# Patient Record
Sex: MALE
[De-identification: names, ages, dates, MRNs, and addresses within clinical notes are randomized per-mention and may not be internally consistent; named-entity substitution may affect disease eponyms.]

---

## 2019-02-20 PROBLEM — Z00.00 ENCOUNTER FOR PREVENTIVE HEALTH EXAMINATION: Status: ACTIVE | Noted: 2019-02-20

## 2019-02-21 ENCOUNTER — APPOINTMENT (OUTPATIENT)
Dept: ORTHOPEDIC SURGERY | Facility: CLINIC | Age: 39
End: 2019-02-21
Payer: COMMERCIAL

## 2019-02-21 VITALS
DIASTOLIC BLOOD PRESSURE: 85 MMHG | BODY MASS INDEX: 21.87 KG/M2 | HEART RATE: 52 BPM | OXYGEN SATURATION: 98 % | SYSTOLIC BLOOD PRESSURE: 132 MMHG | WEIGHT: 165 LBS | HEIGHT: 73 IN

## 2019-02-21 DIAGNOSIS — Z78.9 OTHER SPECIFIED HEALTH STATUS: ICD-10-CM

## 2019-02-21 DIAGNOSIS — Z60.2 PROBLEMS RELATED TO LIVING ALONE: ICD-10-CM

## 2019-02-21 PROCEDURE — 99203 OFFICE O/P NEW LOW 30 MIN: CPT

## 2019-02-21 PROCEDURE — 73030 X-RAY EXAM OF SHOULDER: CPT | Mod: LT

## 2019-02-21 SDOH — SOCIAL STABILITY - SOCIAL INSECURITY: PROBLEMS RELATED TO LIVING ALONE: Z60.2

## 2019-02-21 NOTE — ASSESSMENT
[FreeTextEntry1] : 38-year-old gentleman who fell skiing 4 days ago on his LEFT arm and either has a contusion or may have had a brief subluxation of the glenohumeral joint. There could be an occult fracture if it was more of a contusion type mechanism. He has a lot of pain and limited motion in the shoulder. He's been taking ibuprofen.\par Given the significant limitation and pain I suggested that he get an MRI of the shoulder. He could wait a few more days with rest but if it's not improving I would recommend the MRI. He should use ice. He can take the ibuprofen 400 mg t.i.d. as needed. A second MRI results I will call him and otherwise he should followup in about 2 weeks to reexamine his shoulder.\par Depending on the MRI findings and his next exam we will decide on further treatment which may need physical therapy after a brief period, several weeks, of rest. If he did have a dislocation I think nonoperative treatment would be appropriate given a first-time injury that if he were to have chronic pain or recurrent instability then surgery could be necessary. If there is bone bruising or nondisplaced fracture that will heal in about 6 weeks with rest.\par He should merely rest the arm at the side but can do a little bit of pendulums or assisted motion as pain allows.\par Followup in 2 weeks

## 2019-03-05 PROBLEM — M25.512 LEFT SHOULDER PAIN: Status: ACTIVE | Noted: 2019-02-21

## 2019-03-05 NOTE — REASON FOR VISIT
[Follow-Up Visit] : a follow-up visit for [Shoulder Pain] : shoulder pain [Shoulder Injury] : Shoulder Injury

## 2019-03-06 ENCOUNTER — APPOINTMENT (OUTPATIENT)
Dept: ORTHOPEDIC SURGERY | Facility: CLINIC | Age: 39
End: 2019-03-06
Payer: COMMERCIAL

## 2019-03-06 DIAGNOSIS — D16.12 BENIGN NEOPLASM OF SHORT BONES OF LEFT UPPER LIMB: ICD-10-CM

## 2019-03-06 DIAGNOSIS — M25.512 PAIN IN LEFT SHOULDER: ICD-10-CM

## 2019-03-06 DIAGNOSIS — M20.002 UNSPECIFIED DEFORMITY OF LEFT FINGER(S): ICD-10-CM

## 2019-03-06 PROCEDURE — 99214 OFFICE O/P EST MOD 30 MIN: CPT

## 2019-03-06 NOTE — HISTORY OF PRESENT ILLNESS
[de-identified] : 2 1/2 weeks following a LEFT shoulder injury when he fell skiing. Shoulder is feeling partially better.\par He still has some pain and stiffness. He never went for the MRI. He is partially concerned about the deductible and cost.He takes intermittent ibuprofen as needed but not regularly. He has been working on the motion which is still limited and stiff but has improved\par \par Left small Finger has a history of enchondroma which was excised and bone grafted years ago. He never had any followup. The finger has become more enlarged or deformed. It doesn't hurt significantly but there is stiffness

## 2019-03-06 NOTE — ASSESSMENT
[FreeTextEntry1] : 38-year-old LEFT shoulder injury from a fall Skiing several weeks ago which is partially better but he still has limited motion and weakness. I sentences he had a subluxation of the joint. Given the ongoing symptoms not quickly resolving I recommended that he get an MRI. Over the lateral rotator cuff tear and any labral tears. In the meantime he can start the physical therapy working on getting back the range of motion and pain-free strengthening. Modalities as needed.\par He has a history of enchondroma in the LEFT finger and has significant enlargement and deformity and loss of motion. Dr. Núñez had done surgery years ago but he hasn't had any followup in years. I recommended that he go see Dr. Núñez in the near future to get x-rays and have this evaluated.

## 2019-03-06 NOTE — PHYSICAL EXAM
[UE] : Sensory: Intact in bilateral upper extremities [Rad] : radial 2+ and symmetric bilaterally [de-identified] : LEFT  shoulder:\par Cervical spine is without tenderness and ROM is within normal limits and without pain.\par Normal appearance. \par AROM: 130 FE vs 180 right, IR to T 10 =, 100 abd.\par PROM: 140 FE, 40 ER at the side.\par Motor:  4+/5  supraspinatus,  5-/5 ER, 5/5 IR, 5/5 biceps, 5/5 deltoid.  Normal lift off test\par +Neer, + Quintanilla. -  X-arm.   + Glynn's, + Speeds.\par Tender over the biceps tendon and the anterior shoulder.  \par Sensation is intact distally in the UE.\par Skin is intact in the UE. \par Intact Motor distally.\par LEFT hand:\par There is enlargement and deformity multiple nodules in the fifth finger. Well-healed incision. Decreased range of motion and IP joints

## 2019-03-14 ENCOUNTER — RESULT REVIEW (OUTPATIENT)
Age: 39
End: 2019-03-14

## 2019-04-22 PROBLEM — S42.255D CLOSED NONDISPLACED FRACTURE OF GREATER TUBEROSITY OF LEFT HUMERUS WITH ROUTINE HEALING, SUBSEQUENT ENCOUNTER: Status: ACTIVE | Noted: 2019-03-14

## 2019-04-22 PROBLEM — S43.402D SPRAIN OF LEFT SHOULDER, UNSPECIFIED SHOULDER SPRAIN TYPE, SUBSEQUENT ENCOUNTER: Status: ACTIVE | Noted: 2019-02-21

## 2019-04-23 ENCOUNTER — APPOINTMENT (OUTPATIENT)
Dept: ORTHOPEDIC SURGERY | Facility: CLINIC | Age: 39
End: 2019-04-23
Payer: COMMERCIAL

## 2019-04-23 DIAGNOSIS — S43.402D UNSPECIFIED SPRAIN OF LEFT SHOULDER JOINT, SUBSEQUENT ENCOUNTER: ICD-10-CM

## 2019-04-23 DIAGNOSIS — S42.255D NONDISPLACED FRACTURE OF GREATER TUBEROSITY OF LEFT HUMERUS, SUBSEQUENT ENCOUNTER FOR FRACTURE WITH ROUTINE HEALING: ICD-10-CM

## 2019-04-23 DIAGNOSIS — M25.612 STIFFNESS OF LEFT SHOULDER, NOT ELSEWHERE CLASSIFIED: ICD-10-CM

## 2019-04-23 PROCEDURE — 73030 X-RAY EXAM OF SHOULDER: CPT | Mod: LT

## 2019-04-23 PROCEDURE — 99214 OFFICE O/P EST MOD 30 MIN: CPT

## 2019-04-23 NOTE — PHYSICAL EXAM
[Normal] : Gait: normal [Rad] : radial 2+ and symmetric bilaterally [UE] : Sensory: Intact in bilateral upper extremities [Normal LUE] : Left Upper Extremity: No scars, rashes, lesions, ulcers, skin intact [Normal RUE] : Right Upper Extremity: No scars, rashes, lesions, ulcers, skin intact [Normal Touch] : sensation intact for touch [de-identified] : LEFT  shoulder:\par Cervical spine is without tenderness and ROM is within normal limits and without pain.\par Normal appearance. \par AROM: 100 FE with shoulder shrug vs 180, IR to L1 vs T8 , 80 abd.\par PROM: 110 FE, 40 ER at the side vs 70.\par Motor:  5-/5  supraspinatus,  5/5 ER, 5/5 IR, 5/5 biceps, 5/5 deltoid.  Normal lift off test\par + Neer, + Quintanilla. -  X-arm.   + Mackinaw's, + Speeds.\par Nontender\par No scapular winging.\par Sensation is intact distally in the UE.\par Skin is intact in the UE. \par Intact Motor distally.\par  [de-identified] : \par MRI left shoulder 3/14/19 showed nondisplaced greater tuberosity fx. No tears.\par X-rays 2 views left shoulder today are unremarkable. No visible fx.

## 2019-04-23 NOTE — HISTORY OF PRESENT ILLNESS
[de-identified] : Gustavo comes in for followup for his LEFT shoulder injury that occurred 2 months ago when he fell.\par I saw him once, 2 months ago when he went for the MRI showing a nondisplaced fracture at the greater tuberosity and possible sprain of the a.c. joint.\par His shoulder is feeling better but he has stiffness. He has been very busy at work so he hasn't done much PT. He hasn't been taking any medicine.

## 2019-04-23 NOTE — ASSESSMENT
[FreeTextEntry1] : 38-year-old who injured his shoulder in a fall with nondisplaced greater tuberosity fx. The fx has healed but he has stiffness/ secondary adhesive capsulitis. \par He needs to work on ROM more regularly on his own and do PT.\par Naproxyn prn.\par Heat and ice.\par Followup in about 6 weeks

## 2019-06-11 ENCOUNTER — APPOINTMENT (OUTPATIENT)
Dept: ORTHOPEDIC SURGERY | Facility: CLINIC | Age: 39
End: 2019-06-11

## 2019-10-02 PROBLEM — Z60.2 PERSON LIVING ALONE: Status: ACTIVE | Noted: 2019-02-21

## 2021-10-22 NOTE — PHYSICAL EXAM
[Normal RUE] : Right Upper Extremity: No scars, rashes, lesions, ulcers, skin intact [Normal LUE] : Left Upper Extremity: No scars, rashes, lesions, ulcers, skin intact [Normal Touch] : sensation intact for touch [Normal] : Oriented to person, place, and time, insight and judgement were intact and the affect was normal [UE] : Sensory: Intact in bilateral upper extremities [Rad] : radial 2+ and symmetric bilaterally [de-identified] : Bilateral shoulders:\par Cervical spine is without tenderness and ROM is within normal limits and without pain.\par Normal appearance. No edema, ecchymoses, erythema. Well-developed musculature symmetric\par AROM: 80 FE, IR to L5 versus T9 , 30 abd.\par PROM: 110 FE with pain, 70 ER at the side..\par Motor:  2/5  supraspinatus with severe pain,  5-/5 ER, 5/5 IR, 5-/5 biceps, 5-/5 deltoid.  \par + Neer, + Quintanilla. +  X-arm.   Pain with all movement on provocative testing\par No significant point tenderness around the shoulder/glenohumeral joint\par No scapular winging.\par Sensation is intact distally in the UE.\par Skin is intact in the UE. \par Intact Motor distally.\par  [de-identified] : no respiratory distress [de-identified] : \par X-rays of the LEFT shoulder 3 views today show no visible fractures. There may be a very small Hill-Sachs but in no definitive impaction. No bony Bankart lesion seen. unremarkable  PAST SURGICAL HISTORY:  No significant past surgical history

## 2022-05-10 ENCOUNTER — APPOINTMENT (OUTPATIENT)
Dept: ORTHOPEDIC SURGERY | Facility: CLINIC | Age: 42
End: 2022-05-10

## 2022-05-18 ENCOUNTER — APPOINTMENT (OUTPATIENT)
Dept: ORTHOPEDIC SURGERY | Facility: CLINIC | Age: 42
End: 2022-05-18

## 2025-06-08 NOTE — HISTORY OF PRESENT ILLNESS
Pt arrived onto unit escorted by security staff from [facility]. Pt scanned by "[employer]creen no sharps or contraband noted. Applied ID armband. Pt assisted in to the unit by [employer] tech with a steady gait. Educated and instructed patient on the plan of care, meds, and fall precautions. Pt voiced understanding of all teachings and care plan. Personal belongings inventoried, verified and placed in storage room.    [de-identified] : Gustavo is a 38-year-old left-hand dominant gentleman who injured his LEFT shoulder skiing on Sunday 4 days ago when he fell. \par He thinks he hit the shoulder but isn't sure the exact mechanism\par It briefly felt detached. He didn't feel a definite dislocation and never dislocated in the past but he felt like the shoulder may have been briefly out of place. It was painful. [place]. He cannot lift his arm fully and it's very painful.\par He feels better resting with the arm at the side and worse with any movement. Pain is about a 3/10 it intermittently and localize. He has not seen anyone for this. He's been taking ibuprofen about 400 mg 3 times a day since the injury. No numbness or tingling.